# Patient Record
Sex: FEMALE | Race: WHITE
[De-identification: names, ages, dates, MRNs, and addresses within clinical notes are randomized per-mention and may not be internally consistent; named-entity substitution may affect disease eponyms.]

---

## 2017-09-09 ENCOUNTER — HOSPITAL ENCOUNTER (EMERGENCY)
Dept: HOSPITAL 80 - FED | Age: 22
Discharge: HOME | End: 2017-09-09
Payer: COMMERCIAL

## 2017-09-09 VITALS
OXYGEN SATURATION: 98 % | SYSTOLIC BLOOD PRESSURE: 114 MMHG | TEMPERATURE: 99.5 F | DIASTOLIC BLOOD PRESSURE: 70 MMHG | HEART RATE: 79 BPM

## 2017-09-09 VITALS — RESPIRATION RATE: 18 BRPM

## 2017-09-09 DIAGNOSIS — T83.89XA: Primary | ICD-10-CM

## 2017-09-09 DIAGNOSIS — E86.9: ICD-10-CM
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
## 2017-09-09 LAB
% IMMATURE GRANULYOCYTES: 0.2 % (ref 0–1.1)
ABSOLUTE IMMATURE GRANULOCYTES: 0.01 10^3/UL (ref 0–0.1)
ABSOLUTE NRBC COUNT: 0 10^3/UL (ref 0–0.01)
ADD DIFF?: NO
ADD MORPH?: NO
ADD SCAN?: NO
ANION GAP SERPL CALC-SCNC: 10 MEQ/L (ref 8–16)
ATYPICAL LYMPHOCYTE FLAG: 10 (ref 0–99)
CALCIUM SERPL-MCNC: 9.7 MG/DL (ref 8.5–10.4)
CHLORIDE SERPL-SCNC: 105 MEQ/L (ref 97–110)
CO2 SERPL-SCNC: 22 MEQ/L (ref 22–31)
COLOR UR: (no result)
CREAT SERPL-MCNC: 0.9 MG/DL (ref 0.6–1)
ERYTHROCYTE [DISTWIDTH] IN BLOOD BY AUTOMATED COUNT: 12.6 % (ref 11.5–15.2)
FRAGMENT RBC FLAG: 0 (ref 0–99)
GFR SERPL CREATININE-BSD FRML MDRD: > 60 ML/MIN/{1.73_M2}
GLUCOSE SERPL-MCNC: 74 MG/DL (ref 70–100)
HCT VFR BLD CALC: 40.4 % (ref 38–47)
HGB BLD-MCNC: 13.8 G/DL (ref 12.6–16.3)
LEFT SHIFT FLG: 0 (ref 0–99)
LIPEMIA HEMOLYSIS FLAG: 90 (ref 0–99)
MCH RBC BLDCO QN: 29.6 PG (ref 27.9–34.1)
MCHC RBC AUTO-ENTMCNC: 34.2 G/DL (ref 32.4–36.7)
MCV RBC AUTO: 86.7 FL (ref 81.5–99.8)
MUCOUS THREADS #/AREA URNS LPF: (no result) /LPF
NITRITE UR QL STRIP: NEGATIVE
NRBC-AUTO%: 0 % (ref 0–0.2)
PH UR STRIP: 5 [PH] (ref 5–7.5)
PLATELET # BLD: 285 10^3/UL (ref 150–400)
PLATELET CLUMPS FLAG: 0 (ref 0–99)
PMV BLD AUTO: 10.9 FL (ref 8.7–11.7)
POTASSIUM SERPL-SCNC: 4.3 MEQ/L (ref 3.5–5.2)
RBC # BLD AUTO: 4.66 10^6/UL (ref 4.18–5.33)
RBC #/AREA URNS HPF: (no result) /HPF (ref 0–3)
SODIUM SERPL-SCNC: 137 MEQ/L (ref 134–144)
SP GR UR STRIP: 1.01 (ref 1–1.03)
WBC #/AREA URNS HPF: (no result) /HPF (ref 0–3)

## 2017-09-09 NOTE — EDPHY
H & P


Smoking Status: Never smoked


Time Seen by Provider: 09/09/17 14:17


HPI/ROS: 


CHIEF COMPLAINT:  Abdominal pain





HISTORY OF PRESENT ILLNESS:  22-year-old female presents to the emergency 

department with right lower quadrant abdominal pain.  The patient states that 

over the last week she has had mild intermittent pain in her right lower 

quadrant which today became acutely worse.  No vomiting.  No diarrhea.  No 

reported trauma.  She had some lower back discomfort earlier in the week which 

has resolved.  She had an outpatient ultrasound performed however did not know 

the results of this.  She feels that the pain is much worse today.  She denies 

chest pain or difficulty breathing.  No fevers or chills.  She is due to have 

her menstrual period 3 or 4 days.  She has a ParaGard IUD.





REVIEW OF SYSTEMS:


Constitutional:  No fever, no chills.


Eyes:  No double or blurry vision.


ENT:  No sore throat.


Respiratory:  No cough, no shortness of breath.


Cardiac:  No chest pain.


Gastrointestinal:  Abdominal pain as above.  No vomiting or diarrhea.


Genitourinary:  No dysuria.


Musculoskeletal:  No neck or back pain.


Skin:  No rashes.


Neurological:  No headache. (Nano Bernardrina INOCENCIA)


Past Medical/Surgical History: 





negative


 (JoanaRadha INOCENCIA)


Social History: 





[employer] student from [place] (Gerri Bernarda INOCENCIA)


Physical Exam: 





General Appearance:  Alert, mild-to-moderate distress.  Afebrile.


Eyes:  Pupils equal and round.  Extraocular motions are all intact.


ENT:  Mouth:  Mucous membranes moist.


Respiratory:  No wheezing, rhonchi, or rales, lungs are clear to auscultation.


Cardiovascular:  Regular rate and rhythm.


Gastrointestinal:  Abdomen is soft.  Tenderness with palpation in the right 

lower quadrant.  No rebound, guarding or masses noted.  No CVA tenderness 

bilaterally.


Neurological:  Alert and oriented x 3, cranial nerves II through XII grossly 

intact


Skin:  Warm and dry, no rashes.


Musculoskeletal:  Nontender to palpate along the cervical, thoracic or lumbar 

spine.  Neck is supple.


Extremities:  Full range of motion and no peripheral edema.


Psychiatric:  Patient is oriented X 3, there is no agitation. (Nano Bernardrina INOCENCIA)


Constitutional: 


 Initial Vital Signs











Temperature (C)  37.4 C   09/09/17 14:12


 


Heart Rate  94   09/09/17 14:12


 


Respiratory Rate  18   09/09/17 14:12


 


Blood Pressure  121/76 H  09/09/17 14:12


 


O2 Sat (%)  96   09/09/17 14:12








 











O2 Delivery Mode               Room Air














Allergies/Adverse Reactions: 


 





fluconazole [From Diflucan] Allergy (Unknown, Verified 06/26/15 17:40)


 


ciprofloxacin [From Cipro] Allergy (Verified 09/09/17 14:13)


 


metronidazole [From Flagyl] Allergy (Verified 12/01/16 22:45)


 


pineapple Allergy (Verified 06/26/15 17:40)


 








Home Medications: 














 Medication  Instructions  Recorded


 


Claritin  12/01/16


 


Lexapro  12/01/16














Medical Decision Making





- Diagnostics


Imaging: Discussed imaging studies w/ On call Radiologist





- Diagnostics


Imaging Results: 


 Imaging Impressions





Abdomen Ultrasound  09/09/17 14:54


Impression: 


1. Normal pelvic ultrasound. IUD in good position within the endometrial canal.


2. Normal appendix in the right lower quadrant. 


 


Findings discussed with Radha Bernrad PA-C at  16:59 hour, 9/9/2017.








Pelvic/Renal Ultrasound  09/09/17 14:54


Impression: 


1. Normal pelvic ultrasound. IUD in good position within the endometrial canal.


2. Normal appendix in the right lower quadrant. 


 


Findings discussed with Radha Bernard PA-C at  16:59 hour, 9/9/2017.











ED Course/Re-evaluation: 





22-year-old female presents to the emergency department with abdominal pain.  

Laboratory studies reveal normal white blood cell count and chemistries.  

Urinalysis is unremarkable.





Pelvic ultrasound reveals no evidence of ovarian torsion.  She had her IUD 

which was displaced and stuck in the myometrium on the right side.  Her 

appendix was well visualized and normal.





I spoke with Dr. Ernestina Mcguire who came to evaluate the patient as well and 

removed the patient's IUD.  The patient felt much better.  She was given 15 mg 

of IV Toradol.  She is comfortable being discharged home.





Dr. Ernestina Mcguire recommended obtaining dirty urine to test for gonorrhea and 

chlamydia.  This has been ordered and is pending.  Patient feels comfortable 

being discharged home. (Radha Bernard)


Differential Diagnosis: 





The patient was evaluated and managed by the physician's assistant.  My 

cosignature indicates that I reviewed the chart and I agree with the findings 

and plan of care as documented.  I am the secondary supervising physician. (

Maria Ines Shafer)





Including but not limited to ovarian cyst, ovarian torsion, acute appendicitis, 

urinary tract infection, pyelonephritis, ectopic pregnancy (Radha Bernard)





- Data Points


Laboratory Results: 


 Laboratory Results





 09/09/17 14:17 





 09/09/17 14:17 





 











  09/09/17 09/09/17 09/09/17





  18:43 16:16 14:17


 


WBC      





    


 


RBC      





    


 


Hgb      





    


 


Hct      





    


 


MCV      





    


 


MCH      





    


 


MCHC      





    


 


RDW      





    


 


Plt Count      





    


 


MPV      





    


 


Neut % (Auto)      





    


 


Lymph % (Auto)      





    


 


Mono % (Auto)      





    


 


Eos % (Auto)      





    


 


Baso % (Auto)      





    


 


Nucleat RBC Rel Count      





    


 


Absolute Neuts (auto)      





    


 


Absolute Lymphs (auto)      





    


 


Absolute Monos (auto)      





    


 


Absolute Eos (auto)      





    


 


Absolute Basos (auto)      





    


 


Absolute Nucleated RBC      





    


 


Immature Gran %      





    


 


Immature Gran #      





    


 


Sodium      





    


 


Potassium      





    


 


Chloride      





    


 


Carbon Dioxide      





    


 


Anion Gap      





    


 


BUN      





    


 


Creatinine      





    


 


Estimated GFR      





    


 


Glucose      





    


 


Calcium      





    


 


Beta HCG, Qual      NEGATIVE 





    


 


Urine Color    PALE YELLOW   





    


 


Urine Appearance    CLEAR   





    


 


Urine pH    5.0   





    (5.0-7.5)  


 


Ur Specific Gravity    1.009   





    (1.002-1.030)  


 


Urine Protein    NEGATIVE   





    (NEGATIVE)  


 


Urine Ketones    NEGATIVE   





    (NEGATIVE)  


 


Urine Blood    1+  H   





    (NEGATIVE)  


 


Urine Nitrate    NEGATIVE   





    (NEGATIVE)  


 


Urine Bilirubin    NEGATIVE   





    (NEGATIVE)  


 


Urine Urobilinogen    NEGATIVE EU EU  





    (0.2-1.0)  


 


Ur Leukocyte Esterase    NEGATIVE   





    (NEGATIVE)  


 


Urine RBC    1-3 /hpf /hpf  





    (0-3)  


 


Urine WBC    1-3 /hpf /hpf  





    (0-3)  


 


Ur Epithelial Cells    TRACE /lpf /lpf  





    (NONE-1+)  


 


Urine Mucus    TRACE /lpf /lpf  





    (NONE-1+)  


 


Urine Glucose    NEGATIVE   





    (NEGATIVE)  


 


C.trachomatis RNA (TMA)  Pending     





    


 


N.gonorrhoeae RNA (TMA)  Pending     





    














  09/09/17 09/09/17





  14:17 14:17


 


WBC    5.97 10^3/uL 10^3/uL





    (3.80-9.50) 


 


RBC    4.66 10^6/uL 10^6/uL





    (4.18-5.33) 


 


Hgb    13.8 g/dL g/dL





    (12.6-16.3) 


 


Hct    40.4 % %





    (38.0-47.0) 


 


MCV    86.7 fL fL





    (81.5-99.8) 


 


MCH    29.6 pg pg





    (27.9-34.1) 


 


MCHC    34.2 g/dL g/dL





    (32.4-36.7) 


 


RDW    12.6 % %





    (11.5-15.2) 


 


Plt Count    285 10^3/uL 10^3/uL





    (150-400) 


 


MPV    10.9 fL fL





    (8.7-11.7) 


 


Neut % (Auto)    58.0 % %





    (39.3-74.2) 


 


Lymph % (Auto)    32.0 % %





    (15.0-45.0) 


 


Mono % (Auto)    8.0 % %





    (4.5-13.0) 


 


Eos % (Auto)    1.3 % %





    (0.6-7.6) 


 


Baso % (Auto)    0.5 % %





    (0.3-1.7) 


 


Nucleat RBC Rel Count    0.0 % %





    (0.0-0.2) 


 


Absolute Neuts (auto)    3.46 10^3/uL 10^3/uL





    (1.70-6.50) 


 


Absolute Lymphs (auto)    1.91 10^3/uL 10^3/uL





    (1.00-3.00) 


 


Absolute Monos (auto)    0.48 10^3/uL 10^3/uL





    (0.30-0.80) 


 


Absolute Eos (auto)    0.08 10^3/uL 10^3/uL





    (0.03-0.40) 


 


Absolute Basos (auto)    0.03 10^3/uL 10^3/uL





    (0.02-0.10) 


 


Absolute Nucleated RBC    0.00 10^3/uL 10^3/uL





    (0-0.01) 


 


Immature Gran %    0.2 % %





    (0.0-1.1) 


 


Immature Gran #    0.01 10^3/uL 10^3/uL





    (0.00-0.10) 


 


Sodium  137 mEq/L mEq/L  





   (134-144)  


 


Potassium  4.3 mEq/L mEq/L  





   (3.5-5.2)  


 


Chloride  105 mEq/L mEq/L  





   ()  


 


Carbon Dioxide  22 mEq/l mEq/l  





   (22-31)  


 


Anion Gap  10 mEq/L mEq/L  





   (8-16)  


 


BUN  14 mg/dL mg/dL  





   (7-23)  


 


Creatinine  0.9 mg/dL mg/dL  





   (0.6-1.0)  


 


Estimated GFR  > 60   





   


 


Glucose  74 mg/dL mg/dL  





   ()  


 


Calcium  9.7 mg/dL mg/dL  





   (8.5-10.4)  


 


Beta HCG, Qual    





   


 


Urine Color    





   


 


Urine Appearance    





   


 


Urine pH    





   


 


Ur Specific Gravity    





   


 


Urine Protein    





   


 


Urine Ketones    





   


 


Urine Blood    





   


 


Urine Nitrate    





   


 


Urine Bilirubin    





   


 


Urine Urobilinogen    





   


 


Ur Leukocyte Esterase    





   


 


Urine RBC    





   


 


Urine WBC    





   


 


Ur Epithelial Cells    





   


 


Urine Mucus    





   


 


Urine Glucose    





   


 


C.trachomatis RNA (TMA)    





   


 


N.gonorrhoeae RNA (TMA)    





   











Medications Given: 


 








Discontinued Medications





Diphenhydramine HCl (Benadryl Injection)  50 mg IVP EDNOW ONE


   Stop: 09/09/17 15:10


   Last Admin: 09/09/17 15:18 Dose:  50 mg


Fentanyl (Sublimaze)  50 mcg IVP EDNOW ONE


   Stop: 09/09/17 16:33


   Last Admin: 09/09/17 17:06 Dose:  50 mcg


Hydromorphone HCl (Dilaudid)  0.5 mg IVP EDNOW ONE


   Stop: 09/09/17 14:54


   Last Admin: 09/09/17 15:02 Dose:  0.5 mg


Sodium Chloride (Ns)  1,000 mls @ 0 mls/hr IV EDNOW ONE; Wide Open


   PRN Reason: Protocol


   Stop: 09/09/17 14:54


   Last Admin: 09/09/17 15:02 Dose:  1,000 mls


Ketorolac Tromethamine (Toradol)  15 mg IVP EDNOW ONE


   Stop: 09/09/17 17:51


   Last Admin: 09/09/17 17:57 Dose:  15 mg


Ondansetron HCl (Zofran)  4 mg IVP EDNOW ONE


   Stop: 09/09/17 14:54


   Last Admin: 09/09/17 15:02 Dose:  4 mg


Ranitidine HCl (Zantac)  50 mg IVP EDNOW ONE


   Stop: 09/09/17 15:11


   Last Admin: 09/09/17 15:18 Dose:  50 mg








Departure





- Departure


Disposition: Home, Routine, Self-Care


Clinical Impression: 


IUD migration


Qualifiers:


 Encounter type: initial encounter Qualified Code(s): T83.89XA - Other 

specified complication of genitourinary prosthetic devices, implants and grafts

, initial encounter





Abdominal pain


Qualifiers:


 Abdominal location: right lower quadrant Qualified Code(s): R10.31 - Right 

lower quadrant pain





Condition: Good


Instructions:  Acute Abdominal Pain (ED)


Additional Instructions: 


Abdominal Pain:


Return to the Emergency Department immediately for increasing pain, fever, 

vomiting, or if not completely better in 8-12 hours.





Call [phone] for the results of your gonorrhea and chlamydia cultures.








Referrals: 


LIZZ MATA,. [Primary Care Provider] - As per Instructions


Ernestina Mcguire MD [Medical Doctor] - 5-7 days, call for appt.


(OB/GYN on call


)

## 2017-09-09 NOTE — GCON
[f 
rep st]



                                                                    CONSULTATION





EMERGENCY ROOM CONSULTATION



DATE OF CONSULTATION:  2017



Presentation diagnosis is abdominal and pelvic pain.



HISTORY OF PRESENT ILLNESS:  The patient is a 22-year-old,  0, who 
presents with acute onset of worsening right lower quadrant pain.  She was 
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  She reports that she did have increasing 
pain that was mild and intermittent in the right lower quadrant over the last 
week that would wax and wane and have varying severity.  Over the course of her 
life, she has had pain similar to this.  Today she became much worse, stabbing, 
sharp pain.  No nausea, vomiting, diarrhea.  No fever, chills.  No other 
significant symptoms.  She is due to have her menstrual period in the next few 
days, and she has a Paragard IUD that was placed 6 months ago.  Upon evaluation 
in the Emergency Department, she had a pelvic ultrasound.  The pelvic 
ultrasound revealed a uterus that was dorsiflexed, and the left arm of the IUD 
was appeared to penetrate the myometrium of the uterus, and that seemed to be 
the location corresponding to the worst part of the patient's pain.  Her 
ovaries were normal.  No abnormal cysts.  She also had a right lower quadrant 
ultrasound performed that showed a normal appendix, and she had no point 
tenderness over her appendix.  She had normal ovaries.  No other abnormalities 
in the uterus.  pulmonary embolus.  Vital signs are stable.  The rest of her 
labs were stable, including normal white count, normal electrolytes, and a 
negative pregnancy test.  Therefore, I was consulted to remove the IUD as it 
was penetrating the myometrium, and this is the most likely cause of her pain.



REVIEW OF SYSTEMS:  Negative as per HPI.



PAST MEDICAL HISTORY:  She has no significant past medical or surgical history.



SOCIAL HISTORY:  She is a student at the [employer].  She denies 
smoking.  Social alcohol.  No drug use.  She is in a monogamous relationship 
with her boyfriend, and neither have had other partners.



ALLERGIES:  She reports allergies to fluconazole, Cipro, metronidazole, and 
pineapple.



MEDICATIONS:  Claritin and Lexapro.



FAMILY HISTORY:  Noncontributory.



HOSPITAL COURSE:  I came to analyze the patient for removal of an IUD.



PHYSICAL EXAMINATION:  VITAL SIGNS:  Stable.  GENERAL:  She is a well-developed
, well-nourished white female, in no acute distress.  LUNGS:  Clear to 
auscultation bilaterally.  HEART:  Regular rate and rhythm.  No murmur.  ABDOMEN
:  Soft, nondistended.  Normal bowel sounds.  Mild tenderness in lower 
quadrants.  No rebound or guarding.  PELVIC:  Normal cervix.  ParaGard strings 
were easily visualized.  These were grasped, and the IUD was removed without 
difficulty.  Further pelvic exam, no cervical motion tenderness.  Uterus is 
midline.  No fundal tenderness.  Midline retroverted.  No adnexal masses.



ASSESSMENT AND PLAN:  A 22-year-old, G0 with acute onset of right lower 
quadrant pain, likely from an IUD penetrating into the myometrium.  This was 
removed.  I instructed patient to use condoms for contraception.  Follow up in 
my office in the next few weeks.  We can do an ultrasound as a baseline to 
assess her uterine anatomy, and evaluate her for another IUD if she desires 
with ultrasound guidance at a later time or alternative contraceptive measures.
  We will send a gonorrhea and chlamydia urine to confirm no infection.





Job #:  [id number]

MTDD

## 2017-09-09 NOTE — CPEKG
Heart Rate: 64

RR Interval: 938

P-R Interval: 160

QRSD Interval: 66

QT Interval: 360

QTC Interval: 372

P Axis: 46

QRS Axis: 68

T Wave Axis: 34

EKG Severity - NORMAL ECG -

EKG Impression: SINUS RHYTHM

Electronically Signed By: Maria Ines Shafer 09-Sep-2017 20:01:11

## 2017-09-11 LAB — CHLAMYDIA AMPLIFICATION GENPRB: NEGATIVE

## 2017-09-29 ENCOUNTER — HOSPITAL ENCOUNTER (EMERGENCY)
Dept: HOSPITAL 80 - FED | Age: 22
Discharge: HOME | End: 2017-09-29
Payer: COMMERCIAL

## 2017-09-29 VITALS
HEART RATE: 66 BPM | RESPIRATION RATE: 18 BRPM | SYSTOLIC BLOOD PRESSURE: 106 MMHG | OXYGEN SATURATION: 99 % | DIASTOLIC BLOOD PRESSURE: 75 MMHG | TEMPERATURE: 98.9 F
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
## 2017-09-29 DIAGNOSIS — S09.8XXA: Primary | ICD-10-CM
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
## 2017-09-29 NOTE — EDPHY
H & P


Stated Complaint: Hit head with flag. Possible laceration.


HPI/ROS: 





CHIEF COMPLAINT: 


Head injury





HISTORY OF PRESENT ILLNESS: 


Patient reports a head injury at 5:15 p.m. today.  She says she was twirling a 

flag when she was struck on the top of the head.  No loss of consciousness.  

She did sustain a laceration and "lots of blood."  She complains of a headache.

  She has no neck pain or stiffness.  No dental pain.  No facial pain.  No 

bruising around the eyes.  No nausea or vomiting.  No changes in vision.  

Symptoms mild-to-moderate but constant.  No other associated complaints or 

modifying factors.





REVIEW OF SYSTEMS:


Ten systems reviewed and are negative unless otherwise noted in the HPI





PCP:


[facility]





SPECIALISTS:


None





PAST MEDICAL HISTORY: 


Anxiety, PTSD





PAST SURGICAL HISTORY:


Labral repair June 2016





SOCIAL HISTORY:


Nonsmoker.  Occasional alcohol.  No illicit substance use.  Currently a student 

at [employer].





FAMILY HISTORY:


Noncontributory





EXAMINATION


General Appearance:  Alert, no distress


Head: normocephalic, superficial laceration less than 1 cm top of the scalp in 

the 1st part of the hairline.  No bleeding.  No foreign body.  There is less 

than 1 mm distraction of the wound borders.  No Ziegler sign.  No raccoon eyes.


Eyes:  Pupils equal and round, no conjunctival pallor or injection


ENT, Mouth:  No hemotympanum.  Mucous membranes moist.  Airway widely patent


Neck:  Normal inspection, supple, non-tender.  Painless range of motion all 

planes.  No meningismus or rigidity


Respiratory:  Lungs are clear to auscultation no wheezing rhonchi


Cardiovascular:  Regular rate and rhythm.  No murmur.


Gastrointestinal:  Abdomen is soft and nontender


Back: non-tender, no bony abnormalities


Neurological:  GCS 15. A&O, nonfocal, normal gait.  Strength symmetric in all 4 

limbs.  No pronator drift.  No dysmetria.  Normal mental status


Skin:  Warm and dry, no rash.  Scalp lacerations noted.  No petechiae or 

purpura.  No ecchymosis


Extremities:  Nontender, no pedal edema


Psychiatric:  Mood and affect normal





DIFFERENTIAL DIAGNOSES:


Including but not limited to closed head injury, scalp laceration, contusion, 

hematoma, intracranial hemorrhage, skull fracture








MDM:


7:25 p.m.


Blunt trauma to the top of the scalp by a flag pole.  She has no loss of 

consciousness.  She has no nausea or vomiting.  She has no visual change.  She 

does have a headache but is tolerable.  There is no indication for CT scan of 

the head by [place] CT head rules.  She is awake alert no acute distress.  

Neuro exam is normal.  We discussed discharge home with symptomatic 

medications.  We discussed monitoring for any changes in her symptoms that 

would warrant return to the emergency department.  She is comfortable with this 

plan and discharged home stable condition.  Recommend follow up on campus at 

[facility] for return to [facility]














- Personal History


LMP (Females 10-55): 1-7 Days Ago


Current Tetanus/Diphtheria Vaccine: Yes


Current Tetanus Diphtheria and Acellular Pertussis (TDAP): Yes





- Medical/Surgical History


Hx Asthma: No


Hx Chronic Respiratory Disease: No


Hx Diabetes: No


Hx Cardiac Disease: No


Hx Renal Disease: No


Hx Cirrhosis: No


Hx Alcoholism: No


Hx HIV/AIDS: No


Hx Splenectomy or Spleen Trauma: No


Other PMH: anxiety, R shoulder surg





- Social History


Smoking Status: Never smoked


Constitutional: 


 Initial Vital Signs











Temperature (C)  98.9 F   09/29/17 18:54


 


Heart Rate  66   09/29/17 18:54


 


Respiratory Rate  18   09/29/17 18:54


 


Blood Pressure  106/75   09/29/17 18:54


 


O2 Sat (%)  99   09/29/17 18:54








 











O2 Delivery Mode               Room Air














Allergies/Adverse Reactions: 


 





fluconazole [From Diflucan] Allergy (Unknown, Verified 06/26/15 17:40)


 


ciprofloxacin [From Cipro] Allergy (Verified 09/09/17 14:13)


 


metronidazole [From Flagyl] Allergy (Verified 12/01/16 22:45)


 


pineapple Allergy (Verified 06/26/15 17:40)


 








Home Medications: 














 Medication  Instructions  Recorded


 


Claritin  12/01/16


 


Lexapro  12/01/16














Medical Decision Making





- Data Points


Medications Given: 


 








Discontinued Medications





Ibuprofen (Motrin)  600 mg PO EDNOW ONE


   Stop: 09/29/17 19:16


   Last Admin: 09/29/17 19:20 Dose:  600 mg








Departure





- Departure


Disposition: Home, Routine, Self-Care


Clinical Impression: 


Blunt head trauma


Qualifiers:


 Encounter type: initial encounter Qualified Code(s): S09.8XXA - Other 

specified injuries of head, initial encounter





Closed head injury


Qualifiers:


 Encounter type: initial encounter Qualified Code(s): S09.90XA - Unspecified 

injury of head, initial encounter





Condition: Good


Instructions:  Concussion (ED), Head Injury (ED)


Additional Instructions: 


1. Bacitracin topically once daily for 2 days


2. Head injury precautions as discussed


3. Contact  on Monday morning to discuss paperwork for your flag 

corps clearance. [phone]


4. Ibuprofen 600 mg every 8 hours as needed


Referrals: 


NONE *PRIMARY CARE P,. [Primary Care Provider] - As per Instructions


[facility] [Cl[facility]] - As per Instructions


Kate Osborne MD [Medical Doctor] - As per Instructions


Stand Alone Forms:  Statement of Treatment

## 2019-04-16 ENCOUNTER — HOSPITAL ENCOUNTER (OUTPATIENT)
Dept: HOSPITAL 80 - FED | Age: 24
Setting detail: OBSERVATION
LOS: 2 days | Discharge: HOME | End: 2019-04-18
Attending: INTERNAL MEDICINE | Admitting: INTERNAL MEDICINE
Payer: COMMERCIAL

## 2019-04-16 DIAGNOSIS — E86.0: ICD-10-CM

## 2019-04-16 DIAGNOSIS — A07.3: Primary | ICD-10-CM

## 2019-04-16 LAB — PLATELET # BLD: 213 10^3/UL (ref 150–400)

## 2019-04-16 PROCEDURE — G0378 HOSPITAL OBSERVATION PER HR: HCPCS

## 2019-04-16 RX ADMIN — NITAZOXANIDE SCH MG: 500 TABLET ORAL at 23:37

## 2019-04-16 RX ADMIN — SODIUM CHLORIDE SCH MLS: 900 INJECTION, SOLUTION INTRAVENOUS at 23:10

## 2019-04-16 NOTE — EDPHY
H & P


Time Seen by Provider: 04/16/19 17:39


HPI/ROS: 





CHIEF COMPLAINT:  Abdominal pain cramping and diarrhea





HISTORY OF PRESENT ILLNESS:  Feeling well until this last weekend when she just 

felt more tired than usual.  Started having severe watery diarrhea 2 days ago 

on Sunday and felt dehydrated.  She was seen at [facility] on Monday 

and got a L of IV fluids and felt lightheaded and had an episode of syncope 

there.  She was seen today and diagnosed with Cryptosporidium by a stool sample

, started on Alinia, and given another L of IV fluid.


Presents now with abdominal cramping dizziness feeling lightheaded.  Moderate 

to severe, not associated with vomiting but she does have nausea.





REVIEW OF SYSTEMS:


Eye: no change in vision


ENT: no sore throat


Cardiac: no chest pain or syncope


Pulmonary: no cough or SOB


Abdomen:  HPI


Musculoskeletal: no back pain


Skin: no rash


Neuro: no headache


Constitutional: no fever


: no urinary symptoms





A comprehensive 10 point review of systems is otherwise negative aside from 

elements mentioned in the history of present illness.





PAST MEDICAL HISTORY:  Anxiety and right shoulder surgery





Social history:  No recent foreign travel





General Appearance: Alert and conversant, cooperative.


Eyes: No scleral  icterus. 


ENT, Mouth:  Dry mucous membranes.


Respiratory: Normal respiratory effort, breath sounds equal, lungs are clear to 

auscultation.


Cardiovascular:  Regular rate and rhythm.


Gastrointestinal:  Mild right upper quadrant tenderness, bowel sounds present, 

no rebound or guarding.  No McBurney's point tenderness.


Neurological: Alert, face symmetric, normal motor and sensory in extremities. 


Skin: Warm and dry, no rashes.


Musculoskeletal: No peripheral edema.


Psychiatric: Not agitated.





Emergency Department course/MDM:





IV normal saline hydration, CBC chemistry LFT lipase and pregnancy test.





2027:  Still symptomatic, 2nd dose IV fentanyl, was seen by pharmacy in the 

emergency department, likely a side effect of her Alinia.


Discussed with Dr. Monsivais hospitalist service will admit for symptomatic 

treatment.  Discussed with the patient who is in agreement.


Smoking Status: Never smoked


Constitutional: 


 Initial Vital Signs











Temperature (C)  37 C   04/16/19 17:12


 


Heart Rate  74   04/16/19 17:12


 


Respiratory Rate  17   04/16/19 17:12


 


Blood Pressure  95/63 L  04/16/19 17:12


 


O2 Sat (%)  98   04/16/19 17:12








 











O2 Delivery Mode               Room Air














Allergies/Adverse Reactions: 


 





fluconazole [From Diflucan] Allergy (Unknown, Verified 04/16/19 17:11)


 


ciprofloxacin [From Cipro] Allergy (Verified 04/16/19 17:11)


 


metronidazole [From Flagyl] Allergy (Verified 04/16/19 17:11)


 


pineapple Allergy (Verified 04/16/19 17:11)


 








Home Medications: 














 Medication  Instructions  Recorded


 


Escitalopram Oxalate [Lexapro] 30 mg PO DAILY 12/01/16


 


Cyanocobalamin [Vitamin B12 1,000 mcg IM Q28D 04/16/19





1000MCG/ML (*)]  


 


Etonogestrel/Ethinyl Estradiol 1 each VG Q21D 04/16/19





[Nuvaring Vaginal Ring (RX)]  


 


Nitazoxanide [Alinia] 500 mg PO Q12 04/16/19


 


Ondansetron HCl [Ondansetron HCl] 4 mg PO Q6HRS PRN 04/16/19


 


traZODone [traZODONE 50MG (*)] 50 mg PO HS 04/16/19














Medical Decision Making





- Data Points


Laboratory Results: 


 Laboratory Results





 04/16/19 17:53 





 04/16/19 17:53 





 











  04/16/19 04/16/19 04/16/19





  17:57 17:53 17:53


 


WBC      





    


 


RBC      





    


 


Hgb      





    


 


POC Hgb  11.6 gm/dL L gm/dL    





   (12.6-16.3)   


 


Hct      





    


 


POC Hct  34 % L %    





   (38-47)   


 


MCV      





    


 


MCH      





    


 


MCHC      





    


 


RDW      





    


 


Plt Count      





    


 


MPV      





    


 


Neut % (Auto)      





    


 


Lymph % (Auto)      





    


 


Mono % (Auto)      





    


 


Eos % (Auto)      





    


 


Baso % (Auto)      





    


 


Nucleat RBC Rel Count      





    


 


Absolute Neuts (auto)      





    


 


Absolute Lymphs (auto)      





    


 


Absolute Monos (auto)      





    


 


Absolute Eos (auto)      





    


 


Absolute Basos (auto)      





    


 


Absolute Nucleated RBC      





    


 


Immature Gran %      





    


 


Seg Neutrophils %      





    


 


Band Neutrophils %      





    


 


Lymphocytes %      





    


 


Monocytes %      





    


 


Eosinophils %      





    


 


Basophils %      





    


 


Metamyelocytes %      





    


 


Myelocytes %      





    


 


Promyelocytes %      





    


 


Blast Cells %      





    


 


Immature Gran #      





    


 


Absolute Seg Neuts      





    


 


Absolute Band Neuts      





    


 


Absolute Lymphocytes      





    


 


Absolute Monocytes      





    


 


Absolute Eosinophils      





    


 


Absolute Basophils      





    


 


Absolute Metamyelocyte      





    


 


Absolute Myelocytes      





    


 


Absolute Promyelocytes      





    


 


Absolute Plasma Cells      





    


 


Nucleated RBCs      





    


 


Atypical Lymphocytes      





    


 


Absolute Blast Cells      





    


 


Plasma Cells %      





    


 


Platelet Estimate      





    


 


Microcytic Cells      





    


 


POC Sodium  143 mEq/L mEq/L    





   (135-145)   


 


Sodium      137 mEq/L mEq/L





     (135-145) 


 


POC Potassium  3.4 mEq/L mEq/L    





   (3.3-5.0)   


 


Potassium      3.6 mEq/L mEq/L





     (3.5-5.2) 


 


POC Chloride  107 mEq/L mEq/L    





   ()   


 


Chloride      107 mEq/L mEq/L





     () 


 


Carbon Dioxide      23 mEq/l mEq/l





     (22-31) 


 


POC Total CO2  21 mEq/L L mEq/L    





   (22-31)   


 


Anion Gap      7 mEq/L mEq/L





     (6-14) 


 


POC BUN  4 mg/dL L mg/dL    





   (7-23)   


 


BUN      7 mg/dL mg/dL





     (7-23) 


 


Creatinine      0.8 mg/dL mg/dL





     (0.6-1.0) 


 


POC Creatinine  0.8 mg/dL mg/dL    





   (0.6-1.0)   


 


Estimated GFR      > 60 





    


 


Glucose      87 mg/dL mg/dL





     () 


 


POC Glucose  91 mg/dL mg/dL    





   ()   


 


Calcium      8.1 mg/dL L mg/dL





     (8.5-10.4) 


 


Total Bilirubin      0.3 mg/dL mg/dL





     (0.1-1.4) 


 


Conjugated Bilirubin      0.3 mg/dL mg/dL





     (0.0-0.5) 


 


Unconjugated Bilirubin      0.0 mg/dL mg/dL





     (0.0-1.1) 


 


AST      20 IU/L IU/L





     (14-46) 


 


ALT      28 IU/L IU/L





     (9-52) 


 


Alkaline Phosphatase      32 IU/L L IU/L





     () 


 


Total Protein      5.8 g/dL L g/dL





     (6.3-8.2) 


 


Albumin      3.4 g/dL L g/dL





     (3.5-5.0) 


 


Lipase      88 IU/L IU/L





     () 


 


Beta HCG, Qual    NEGATIVE   





    














  04/16/19





  17:53


 


WBC  2.68 10^3/uL L 10^3/uL





   (3.80-9.50) 


 


RBC  4.16 10^6/uL L 10^6/uL





   (4.18-5.33) 


 


Hgb  12.2 g/dL L g/dL





   (12.6-16.3) 


 


POC Hgb  





  


 


Hct  35.6 % L %





   (38.0-47.0) 


 


POC Hct  





  


 


MCV  85.6 fL fL





   (81.5-99.8) 


 


MCH  29.3 pg pg





   (27.9-34.1) 


 


MCHC  34.3 g/dL g/dL





   (32.4-36.7) 


 


RDW  12.3 % %





   (11.5-15.2) 


 


Plt Count  213 10^3/uL 10^3/uL





   (150-400) 


 


MPV  11.0 fL fL





   (8.7-11.7) 


 


Neut % (Auto)  Not Reported 





  


 


Lymph % (Auto)  Not Reported 





  


 


Mono % (Auto)  Not Reported 





  


 


Eos % (Auto)  Not Reported 





  


 


Baso % (Auto)  Not Reported 





  


 


Nucleat RBC Rel Count  Not Reported 





  


 


Absolute Neuts (auto)  Not Reported 





  


 


Absolute Lymphs (auto)  Not Reported 





  


 


Absolute Monos (auto)  Not Reported 





  


 


Absolute Eos (auto)  Not Reported 





  


 


Absolute Basos (auto)  Not Reported 





  


 


Absolute Nucleated RBC  Not Reported 





  


 


Immature Gran %  Not Reported 





  


 


Seg Neutrophils %  17.8 % %





  


 


Band Neutrophils %  7.9 % %





  


 


Lymphocytes %  62.4 % %





  


 


Monocytes %  8.9 % %





  


 


Eosinophils %  3.0 % %





  


 


Basophils %  0.0 % %





  


 


Metamyelocytes %  0.0 % %





  


 


Myelocytes %  0.0 % %





  


 


Promyelocytes %  0.0 % %





  


 


Blast Cells %  0.0 % %





  


 


Immature Gran #  Not Reported 





  


 


Absolute Seg Neuts  0.48 10^3/uL L 10^3/uL





   (1.70-6.50) 


 


Absolute Band Neuts  0.21 10^3/uL 10^3/uL





   (0.00-0.70) 


 


Absolute Lymphocytes  1.67 10^3/uL 10^3/uL





   (1.00-3.00) 


 


Absolute Monocytes  0.24 10^3/uL L 10^3/uL





   (0.30-0.80) 


 


Absolute Eosinophils  0.08 10^3/uL 10^3/uL





   (0.03-0.40) 


 


Absolute Basophils  0.00 10^3/uL L 10^3/uL





   (0.02-0.10) 


 


Absolute Metamyelocyte  0.00 10^3/mL 10^3/mL





   (0.00-0.00) 


 


Absolute Myelocytes  0.00 10^3/mL 10^3/mL





   (0.00-0.00) 


 


Absolute Promyelocytes  0.00 10^3/uL 10^3/uL





   (0.00-0.00) 


 


Absolute Plasma Cells  0.00 10^3/uL 10^3/uL





   (0.00-0.00) 


 


Nucleated RBCs  0 /100 WBC /100 WBC





   (0-0) 


 


Atypical Lymphocytes  1+  H 





  


 


Absolute Blast Cells  0.00 10^3/uL 10^3/uL





   (0.00-0.00) 


 


Plasma Cells %  0.0 % %





  


 


Platelet Estimate  ADEQUATE 





   (ADEQ) 


 


Microcytic Cells  1+  H 





  


 


POC Sodium  





  


 


Sodium  





  


 


POC Potassium  





  


 


Potassium  





  


 


POC Chloride  





  


 


Chloride  





  


 


Carbon Dioxide  





  


 


POC Total CO2  





  


 


Anion Gap  





  


 


POC BUN  





  


 


BUN  





  


 


Creatinine  





  


 


POC Creatinine  





  


 


Estimated GFR  





  


 


Glucose  





  


 


POC Glucose  





  


 


Calcium  





  


 


Total Bilirubin  





  


 


Conjugated Bilirubin  





  


 


Unconjugated Bilirubin  





  


 


AST  





  


 


ALT  





  


 


Alkaline Phosphatase  





  


 


Total Protein  





  


 


Albumin  





  


 


Lipase  





  


 


Beta HCG, Qual  





  











Medications Given: 


 








Discontinued Medications





Dicyclomine HCl (Bentyl)  20 mg PO EDNOW ONE


   Stop: 04/16/19 19:19


   Last Admin: 04/16/19 19:24 Dose:  20 mg


Fentanyl (Sublimaze)  50 mcg IVP EDNOW ONE


   Stop: 04/16/19 18:18


   Last Admin: 04/16/19 18:22 Dose:  50 mcg


Fentanyl (Sublimaze)  50 mcg IVP EDNOW ONE


   Stop: 04/16/19 20:28


   Last Admin: 04/16/19 20:30 Dose:  50 mcg


Sodium Chloride (Ns)  1,000 mls @ 0 mls/hr IV EDNOW ONE; Wide Open


   PRN Reason: Protocol


   Stop: 04/16/19 18:00


   Last Admin: 04/16/19 18:16 Dose:  1,000 mls


Sodium Chloride (Ns)  1,000 mls @ 0 mls/hr IV EDNOW ONE; Wide Open


   PRN Reason: Protocol


   Stop: 04/16/19 19:02


   Last Admin: 04/16/19 19:07 Dose:  1,000 mls


Lorazepam (Ativan Injection)  0.5 mg IVP EDNOW ONE


   Stop: 04/16/19 21:09


   Last Admin: 04/16/19 21:12 Dose:  0.5 mg





Point of Care Test Results: 


 Chemistry











  04/16/19





  17:57


 


POC Sodium  143 mEq/L mEq/L





   (135-145) 


 


POC Potassium  3.4 mEq/L mEq/L





   (3.3-5.0) 


 


POC Chloride  107 mEq/L mEq/L





   () 


 


POC Total CO2  21 mEq/L L mEq/L





   (22-31) 


 


POC BUN  4 mg/dL L mg/dL





   (7-23) 


 


POC Creatinine  0.8 mg/dL mg/dL





   (0.6-1.0) 


 


POC Glucose  91 mg/dL mg/dL





   () 








 ISTAT H&H











  04/16/19





  17:57


 


POC Hgb  11.6 gm/dL L gm/dL





   (12.6-16.3) 


 


POC Hct  34 % L %





   (38-47) 














Departure





- Departure


Disposition: [facility] Acute


Clinical Impression: 


 Diarrhea due to cryptosporidium





Abdominal pain


Qualifiers:


 Abdominal location: generalized Qualified Code(s): R10.84 - Generalized 

abdominal pain





Condition: Good

## 2019-04-16 NOTE — PDGENHP
<Magali Howard - Last Filed: 04/16/19 22:17>





History and Physical





- Chief Complaint


Abdominal cramping, diarrhea, nausea





- History of Present Illness


This is a 23-year-old female who is relatively healthy came into the emergency 

room after experiencing Cryptosporidium and prescribed Alinia today at 

[facility].  She reports having watery diarrhea on Sunday and 

felt extremely dehydrated; she was seen at the [facility] yesterday 

and received a L of IV fluids and at the same time felt lightheaded and had an 

episode of syncope while there.  She went back to [facility] today 

where a stool sample was performed and found to have Crytosporidium and also 

received another L of IV fluid.  She reported taking a dose of Alinia today and 

feels like her abdominal cramping is quite painful, and continues to feel very 

lightheaded and dehydrated.  She denies any recent travel.





She is being admitted for further workup, treatment and monitoring.





History Information





- Allergies/Home Medication List


Allergies/Adverse Reactions: 








fluconazole [From Diflucan] Allergy (Unknown, Verified 04/16/19 17:11)


 


ciprofloxacin [From Cipro] Allergy (Verified 04/16/19 17:11)


 


metronidazole [From Flagyl] Allergy (Verified 04/16/19 17:11)


 


pineapple Allergy (Verified 04/16/19 17:11)


 





Home Medications: 








Escitalopram Oxalate [Lexapro] 30 mg PO HS 12/01/16 [Last Taken 04/15/19]


Cyanocobalamin [Vitamin B12 1000MCG/ML (*)] 1,000 mcg IM Q28D 04/16/19 [Last 

Taken 04/01/19]


Etonogestrel/Ethinyl Estradiol [Nuvaring Vaginal Ring (RX)] 1 each VG Q21D 04/16 /19 [Last Taken Unknown]


Nitazoxanide [Alinia] 500 mg PO Q12 04/16/19 [Last Taken 04/16/19 10:00]


Ondansetron HCl [Ondansetron HCl] 4 mg PO Q6HRS PRN 04/16/19 [Last Taken Unknown

]


traZODone [traZODONE 50MG (*)] 50 mg PO HS 04/16/19 [Last Taken Unknown]





I have personally reviewed and updated: family history, medical history, social 

history, surgical history





- Past Medical History


Additional medical history: Anxiety, ovarian cyst, right shoulder surgery





- Surgical History


Reports: no pertinent surgical hx





- Family History


Positive for: non-pertinent





- Social History


Smoking Status: Never smoked


Alcohol Use: Occasionally


Drug Use: Marijuana


Additional social history: She goes to [employer] as a 

student; she is majoring in anthropology and environmental science; she is 

graduating this may





Review of Systems


Review of Systems: 





ROS: 10pt was reviewed & negative except for what was stated in HPI & below





Physical Exam


Physical Exam: 


Lab data and imaging reviewed.





White blood count:  2.68


Hemoglobin and hematocrit:  12.2 and 35.6


Platelet count:  213 





Sodium:137


Potassium:  3.6


Chloride:  107


Carbon dioxide:  23


BUN/Cr: 7/0.8

















Temp Pulse Resp BP Pulse Ox


 


 36.8 C   74   16   116/72   94 


 


 04/16/19 21:36  04/16/19 21:36  04/16/19 21:36  04/16/19 21:36  04/16/19 21:36











Constitutional: uncomfortable


Eyes: PERRL, anicteric sclera, EOMI


Ears, Nose, Mouth, Throat: hearing normal, ears appear normal, no oral mucosal 

ulcers, dry mucous membranes


Cardiovascular: regular rate and rhythym, no murmur, rub, or gallop, No edema


Peripheral Pulses: 2+: dorsalis-pedis (R), dorsalis-pedis (L)


Respiratory: no respiratory distress, no rales or rhonchi, clear to auscultation


Gastrointestinal: normoactive bowel sounds, no palpable masses, tenderness


Genitourinary: no bladder fullness, no bladder tenderness


Skin: warm, normal color, no rashes or abrasions, no fluctuance, no induration, 

No mottled


Musculoskeletal: full muscle strength, no muscle tenderness, normal joint ROM, 

no joint effusions


Neurologic: AAOx3, sensation intact bilaterally, CN II-XII Intact


Psychiatric: interacting appropriately, not anxious, not encephalopathic, 

thought process linear


Lymph, Heme, Immunologic: no cervical LAD, no supraclavicular LAD





Lab Data & Imaging Review





 04/16/19 17:53





 04/16/19 17:53














WBC  2.68 10^3/uL (3.80-9.50)  L  04/16/19  17:53    


 


RBC  4.16 10^6/uL (4.18-5.33)  L  04/16/19  17:53    


 


Hgb  12.2 g/dL (12.6-16.3)  L  04/16/19  17:53    


 


POC Hgb  11.6 gm/dL (12.6-16.3)  L  04/16/19  17:57    


 


Hct  35.6 % (38.0-47.0)  L  04/16/19  17:53    


 


POC Hct  34 % (38-47)  L  04/16/19  17:57    


 


MCV  85.6 fL (81.5-99.8)   04/16/19  17:53    


 


MCH  29.3 pg (27.9-34.1)   04/16/19  17:53    


 


MCHC  34.3 g/dL (32.4-36.7)   04/16/19  17:53    


 


RDW  12.3 % (11.5-15.2)   04/16/19  17:53    


 


Plt Count  213 10^3/uL (150-400)   04/16/19  17:53    


 


MPV  11.0 fL (8.7-11.7)   04/16/19  17:53    


 


Neut % (Auto)  Not Reported   04/16/19  17:53    


 


Lymph % (Auto)  Not Reported   04/16/19  17:53    


 


Mono % (Auto)  Not Reported   04/16/19  17:53    


 


Eos % (Auto)  Not Reported   04/16/19  17:53    


 


Baso % (Auto)  Not Reported   04/16/19  17:53    


 


Nucleat RBC Rel Count  Not Reported   04/16/19  17:53    


 


Absolute Neuts (auto)  Not Reported   04/16/19  17:53    


 


Absolute Lymphs (auto)  Not Reported   04/16/19  17:53    


 


Absolute Monos (auto)  Not Reported   04/16/19  17:53    


 


Absolute Eos (auto)  Not Reported   04/16/19  17:53    


 


Absolute Basos (auto)  Not Reported   04/16/19  17:53    


 


Absolute Nucleated RBC  Not Reported   04/16/19  17:53    


 


Immature Gran %  Not Reported   04/16/19  17:53    


 


Seg Neutrophils %  17.8 %  04/16/19  17:53    


 


Band Neutrophils %  7.9 %  04/16/19  17:53    


 


Lymphocytes %  62.4 %  04/16/19  17:53    


 


Monocytes %  8.9 %  04/16/19  17:53    


 


Eosinophils %  3.0 %  04/16/19  17:53    


 


Basophils %  0.0 %  04/16/19  17:53    


 


Metamyelocytes %  0.0 %  04/16/19  17:53    


 


Myelocytes %  0.0 %  04/16/19  17:53    


 


Promyelocytes %  0.0 %  04/16/19  17:53    


 


Blast Cells %  0.0 %  04/16/19  17:53    


 


Immature Gran #  Not Reported   04/16/19  17:53    


 


Absolute Seg Neuts  0.48 10^3/uL (1.70-6.50)  L  04/16/19  17:53    


 


Absolute Band Neuts  0.21 10^3/uL (0.00-0.70)   04/16/19  17:53    


 


Absolute Lymphocytes  1.67 10^3/uL (1.00-3.00)   04/16/19  17:53    


 


Absolute Monocytes  0.24 10^3/uL (0.30-0.80)  L  04/16/19  17:53    


 


Absolute Eosinophils  0.08 10^3/uL (0.03-0.40)   04/16/19  17:53    


 


Absolute Basophils  0.00 10^3/uL (0.02-0.10)  L  04/16/19  17:53    


 


Absolute Metamyelocyte  0.00 10^3/mL (0.00-0.00)   04/16/19  17:53    


 


Absolute Myelocytes  0.00 10^3/mL (0.00-0.00)   04/16/19  17:53    


 


Absolute Promyelocytes  0.00 10^3/uL (0.00-0.00)   04/16/19  17:53    


 


Absolute Plasma Cells  0.00 10^3/uL (0.00-0.00)   04/16/19  17:53    


 


Nucleated RBCs  0 /100 WBC (0-0)   04/16/19  17:53    


 


Atypical Lymphocytes  1+  H  04/16/19  17:53    


 


Absolute Blast Cells  0.00 10^3/uL (0.00-0.00)   04/16/19  17:53    


 


Plasma Cells %  0.0 %  04/16/19  17:53    


 


Platelet Estimate  ADEQUATE  (ADEQ)   04/16/19  17:53    


 


Microcytic Cells  1+  H  04/16/19  17:53    


 


POC Sodium  143 mEq/L (135-145)   04/16/19  17:57    


 


Sodium  137 mEq/L (135-145)   04/16/19  17:53    


 


POC Potassium  3.4 mEq/L (3.3-5.0)   04/16/19  17:57    


 


Potassium  3.6 mEq/L (3.5-5.2)   04/16/19  17:53    


 


POC Chloride  107 mEq/L ()   04/16/19  17:57    


 


Chloride  107 mEq/L ()   04/16/19  17:53    


 


Carbon Dioxide  23 mEq/l (22-31)   04/16/19  17:53    


 


POC Total CO2  21 mEq/L (22-31)  L  04/16/19  17:57    


 


Anion Gap  7 mEq/L (6-14)   04/16/19  17:53    


 


POC BUN  4 mg/dL (7-23)  L  04/16/19  17:57    


 


BUN  7 mg/dL (7-23)   04/16/19  17:53    


 


Creatinine  0.8 mg/dL (0.6-1.0)   04/16/19  17:53    


 


POC Creatinine  0.8 mg/dL (0.6-1.0)   04/16/19  17:57    


 


Estimated GFR  > 60   04/16/19  17:53    


 


Glucose  87 mg/dL ()   04/16/19  17:53    


 


POC Glucose  91 mg/dL ()   04/16/19  17:57    


 


Calcium  8.1 mg/dL (8.5-10.4)  L  04/16/19  17:53    


 


Total Bilirubin  0.3 mg/dL (0.1-1.4)   04/16/19  17:53    


 


Conjugated Bilirubin  0.3 mg/dL (0.0-0.5)   04/16/19  17:53    


 


Unconjugated Bilirubin  0.0 mg/dL (0.0-1.1)   04/16/19  17:53    


 


AST  20 IU/L (14-46)   04/16/19  17:53    


 


ALT  28 IU/L (9-52)   04/16/19  17:53    


 


Alkaline Phosphatase  32 IU/L ()  L  04/16/19  17:53    


 


Total Protein  5.8 g/dL (6.3-8.2)  L  04/16/19  17:53    


 


Albumin  3.4 g/dL (3.5-5.0)  L  04/16/19  17:53    


 


Lipase  88 IU/L ()   04/16/19  17:53    


 


Beta HCG, Qual  NEGATIVE   04/16/19  17:53    











Assessment & Plan


Plan: 


22 y/o female who was tested positive at [facility] today for 

cryptosporidium.  Her vital signs are the following; blood pressure 118/72, 

heart rate 71, respirations 16, temperature 37.0 degrees, and oxygen saturation 

at 99% on room air.





Abdominal pain (Acute)


Diarrhea due to cryptosporidium (Acute)





#Cryptosporidium: Do not have [facility] records indicating this and they are 

now closed so I am unable to request records.


- GI panel PCR pending


- Cont Alinia unless GI panel results w/something different


-CBC/BMP in AM





#Abdominal pain/nausea


-Received Bentyl in ED; cont this QID


-Anti-emetics





#Dehydration


-Received 2L NS in ED; cont IVF x 2 bags








Diet: Regular


Code: Full


VTE ppx: Low risk


Dispo: admit to obs

















<Jo Monsivais - Last Filed: 04/17/19 01:12>





History and Physical





- History of Present Illness








Review of Systems


Review of Systems: 








Physical Exam


Physical Exam: 

















Temp Pulse Resp BP Pulse Ox


 


 37.2 C   73   16   123/64 H  96 


 


 04/16/19 22:17  04/16/19 22:17  04/16/19 22:17  04/16/19 22:17  04/16/19 22:17














Lab Data & Imaging Review





 04/16/19 17:53





 04/16/19 17:53














WBC  2.68 10^3/uL (3.80-9.50)  L  04/16/19  17:53    


 


RBC  4.16 10^6/uL (4.18-5.33)  L  04/16/19  17:53    


 


Hgb  12.2 g/dL (12.6-16.3)  L  04/16/19  17:53    


 


POC Hgb  11.6 gm/dL (12.6-16.3)  L  04/16/19  17:57    


 


Hct  35.6 % (38.0-47.0)  L  04/16/19  17:53    


 


POC Hct  34 % (38-47)  L  04/16/19  17:57    


 


MCV  85.6 fL (81.5-99.8)   04/16/19  17:53    


 


MCH  29.3 pg (27.9-34.1)   04/16/19  17:53    


 


MCHC  34.3 g/dL (32.4-36.7)   04/16/19  17:53    


 


RDW  12.3 % (11.5-15.2)   04/16/19  17:53    


 


Plt Count  213 10^3/uL (150-400)   04/16/19  17:53    


 


MPV  11.0 fL (8.7-11.7)   04/16/19  17:53    


 


Neut % (Auto)  Not Reported   04/16/19  17:53    


 


Lymph % (Auto)  Not Reported   04/16/19  17:53    


 


Mono % (Auto)  Not Reported   04/16/19  17:53    


 


Eos % (Auto)  Not Reported   04/16/19  17:53    


 


Baso % (Auto)  Not Reported   04/16/19  17:53    


 


Nucleat RBC Rel Count  Not Reported   04/16/19  17:53    


 


Absolute Neuts (auto)  Not Reported   04/16/19  17:53    


 


Absolute Lymphs (auto)  Not Reported   04/16/19  17:53    


 


Absolute Monos (auto)  Not Reported   04/16/19  17:53    


 


Absolute Eos (auto)  Not Reported   04/16/19  17:53    


 


Absolute Basos (auto)  Not Reported   04/16/19  17:53    


 


Absolute Nucleated RBC  Not Reported   04/16/19  17:53    


 


Immature Gran %  Not Reported   04/16/19  17:53    


 


Seg Neutrophils %  17.8 %  04/16/19  17:53    


 


Band Neutrophils %  7.9 %  04/16/19  17:53    


 


Lymphocytes %  62.4 %  04/16/19  17:53    


 


Monocytes %  8.9 %  04/16/19  17:53    


 


Eosinophils %  3.0 %  04/16/19  17:53    


 


Basophils %  0.0 %  04/16/19  17:53    


 


Metamyelocytes %  0.0 %  04/16/19  17:53    


 


Myelocytes %  0.0 %  04/16/19  17:53    


 


Promyelocytes %  0.0 %  04/16/19  17:53    


 


Blast Cells %  0.0 %  04/16/19  17:53    


 


Immature Gran #  Not Reported   04/16/19  17:53    


 


Absolute Seg Neuts  0.48 10^3/uL (1.70-6.50)  L  04/16/19  17:53    


 


Absolute Band Neuts  0.21 10^3/uL (0.00-0.70)   04/16/19  17:53    


 


Absolute Lymphocytes  1.67 10^3/uL (1.00-3.00)   04/16/19  17:53    


 


Absolute Monocytes  0.24 10^3/uL (0.30-0.80)  L  04/16/19  17:53    


 


Absolute Eosinophils  0.08 10^3/uL (0.03-0.40)   04/16/19  17:53    


 


Absolute Basophils  0.00 10^3/uL (0.02-0.10)  L  04/16/19  17:53    


 


Absolute Metamyelocyte  0.00 10^3/mL (0.00-0.00)   04/16/19  17:53    


 


Absolute Myelocytes  0.00 10^3/mL (0.00-0.00)   04/16/19  17:53    


 


Absolute Promyelocytes  0.00 10^3/uL (0.00-0.00)   04/16/19  17:53    


 


Absolute Plasma Cells  0.00 10^3/uL (0.00-0.00)   04/16/19  17:53    


 


Nucleated RBCs  0 /100 WBC (0-0)   04/16/19  17:53    


 


Atypical Lymphocytes  1+  H  04/16/19  17:53    


 


Absolute Blast Cells  0.00 10^3/uL (0.00-0.00)   04/16/19  17:53    


 


Plasma Cells %  0.0 %  04/16/19  17:53    


 


Platelet Estimate  ADEQUATE  (ADEQ)   04/16/19  17:53    


 


Microcytic Cells  1+  H  04/16/19  17:53    


 


POC Sodium  143 mEq/L (135-145)   04/16/19  17:57    


 


Sodium  137 mEq/L (135-145)   04/16/19  17:53    


 


POC Potassium  3.4 mEq/L (3.3-5.0)   04/16/19  17:57    


 


Potassium  3.6 mEq/L (3.5-5.2)   04/16/19  17:53    


 


POC Chloride  107 mEq/L ()   04/16/19  17:57    


 


Chloride  107 mEq/L ()   04/16/19  17:53    


 


Carbon Dioxide  23 mEq/l (22-31)   04/16/19  17:53    


 


POC Total CO2  21 mEq/L (22-31)  L  04/16/19  17:57    


 


Anion Gap  7 mEq/L (6-14)   04/16/19  17:53    


 


POC BUN  4 mg/dL (7-23)  L  04/16/19  17:57    


 


BUN  7 mg/dL (7-23)   04/16/19  17:53    


 


Creatinine  0.8 mg/dL (0.6-1.0)   04/16/19  17:53    


 


POC Creatinine  0.8 mg/dL (0.6-1.0)   04/16/19  17:57    


 


Estimated GFR  > 60   04/16/19  17:53    


 


Glucose  87 mg/dL ()   04/16/19  17:53    


 


POC Glucose  91 mg/dL ()   04/16/19  17:57    


 


Calcium  8.1 mg/dL (8.5-10.4)  L  04/16/19  17:53    


 


Total Bilirubin  0.3 mg/dL (0.1-1.4)   04/16/19  17:53    


 


Conjugated Bilirubin  0.3 mg/dL (0.0-0.5)   04/16/19  17:53    


 


Unconjugated Bilirubin  0.0 mg/dL (0.0-1.1)   04/16/19  17:53    


 


AST  20 IU/L (14-46)   04/16/19  17:53    


 


ALT  28 IU/L (9-52)   04/16/19  17:53    


 


Alkaline Phosphatase  32 IU/L ()  L  04/16/19  17:53    


 


Total Protein  5.8 g/dL (6.3-8.2)  L  04/16/19  17:53    


 


Albumin  3.4 g/dL (3.5-5.0)  L  04/16/19  17:53    


 


Lipase  88 IU/L ()   04/16/19  17:53    


 


Beta HCG, Qual  NEGATIVE   04/16/19  17:53    











Assessment & Plan


Assessment: 








Abdominal pain (Acute)


Diarrhea due to cryptosporidium (Acute)








Plan: 





PHYSICIAN ADDENDUM: 


Agree w/ above. 


Saw patient. Discussed w/ Maricel Howard NP. 


-Jo Monsivais DO

## 2019-04-17 LAB — PLATELET # BLD: 201 10^3/UL (ref 150–400)

## 2019-04-17 RX ADMIN — NITAZOXANIDE SCH MG: 500 TABLET ORAL at 20:45

## 2019-04-17 RX ADMIN — SODIUM CHLORIDE SCH MLS: 900 INJECTION, SOLUTION INTRAVENOUS at 06:13

## 2019-04-17 RX ADMIN — DICYCLOMINE HYDROCHLORIDE SCH MG: 20 TABLET ORAL at 11:36

## 2019-04-17 RX ADMIN — NITAZOXANIDE SCH MG: 500 TABLET ORAL at 10:10

## 2019-04-17 RX ADMIN — DICYCLOMINE HYDROCHLORIDE SCH MG: 20 TABLET ORAL at 17:44

## 2019-04-17 RX ADMIN — DICYCLOMINE HYDROCHLORIDE SCH MG: 20 TABLET ORAL at 20:38

## 2019-04-17 RX ADMIN — ONDANSETRON PRN MG: 4 TABLET, ORALLY DISINTEGRATING ORAL at 00:11

## 2019-04-17 RX ADMIN — DICYCLOMINE HYDROCHLORIDE SCH MG: 20 TABLET ORAL at 06:11

## 2019-04-17 NOTE — ASMTCASEMG
Living Arrangements

 

What is your living           Answers:  With Other (Not Family)               

arrangement? Who do you                                                       

live with?                                                                    

Type Of Residence

 

What kind of residence do     Answers:  Apartment                             

you live in?                                                                  

Discharge Plan Comments

 

Coordination Status           

Comments                      

Notes:

Patient is a 22yo single female, [employer] student, who is being admitted OBS for 

Cryptosporidium, abdominal pain/nausea and dehydration. Patient will most likely d/c 

independently. CM available for any d/c needs that might arise.

 

Date Signed:  04/17/2019 10:15 AM

Electronically Signed By:Subha Crawley LCSW

## 2019-04-17 NOTE — HOSPPROG
Hospitalist Progress Note


Assessment/Plan: 


22 y/o female admitted with NV and diarrhea after being started on Avinia for 

new diagnosis of Cryptosporidium. 








#Cryptosporidium: Do not have [facility] records indicating this and they are 

now closed so I am unable to request records.


- GI panel PCR pending


- Cont Alinia unless GI panel results w/something different


-CBC/BMP in AM





#Abdominal pain- likely secondary to cryptosporidium and Alinia


-Received Bentyl in ED; cont this QID


-Anti-emetics





Intractable nausea and vomiting- due to medication and infection. Cont IV fluids

, antiemetics. 





#Dehydration


-continue intravenous saline


-antiemetics





Diet: Regular


Code: Full


VTE ppx: Low risk


Dispo: patient not able to take anything orally. Will make inpatient for 

intractable nausea and vomiting. 








Subjective: having voluminous watery diarrhea every 20 minutes. also with 

continued nausea and vomiting.


Objective: 


 Vital Signs











Temp Pulse Resp BP Pulse Ox


 


 36.9 C   54 L  16   106/61   97 


 


 04/17/19 12:01  04/17/19 12:01  04/17/19 12:01  04/17/19 12:01  04/17/19 12:01








 Laboratory Results





 04/17/19 04:48 





 04/17/19 04:48 





 











 04/16/19 04/17/19 04/18/19





 05:59 05:59 05:59


 


Intake Total  700 868


 


Output Total  1700 


 


Balance  -1000 868














- Physical Exam


Constitutional: no apparent distress, appears nourished, not in pain


Eyes: PERRL, anicteric sclera, EOMI


Ears, Nose, Mouth, Throat: moist mucous membranes, hearing normal, ears appear 

normal, no oral mucosal ulcers


Cardiovascular: regular rate and rhythym, no murmur, rub, or gallop


Respiratory: no respiratory distress, no rales or rhonchi, clear to auscultation


Gastrointestinal: normoactive bowel sounds, soft, non-tender abdomen, no 

palpable masses


Genitourinary: no bladder fullness, no bladder tenderness, no renal bruits


Skin: no rashes or abrasions, no fluctuance, no induration


Musculoskeletal: full muscle strength, no muscle tenderness, normal joint ROM


Neurologic: AAOx3, sensation intact bilaterally


Psychiatric: interacting appropriately, not anxious, not encephalopathic, 

thought process linear


Lymph, Heme, Immunologic: no cervical LAD, no supraclavicular LAD





ICD10 Worksheet


Patient Problems: 


 Problems











Problem Status Onset


 


Abdominal pain Acute  


 


Diarrhea due to cryptosporidium Acute

## 2019-04-18 VITALS — SYSTOLIC BLOOD PRESSURE: 114 MMHG | DIASTOLIC BLOOD PRESSURE: 68 MMHG

## 2019-04-18 RX ADMIN — DICYCLOMINE HYDROCHLORIDE SCH MG: 20 TABLET ORAL at 15:42

## 2019-04-18 RX ADMIN — DICYCLOMINE HYDROCHLORIDE SCH MG: 20 TABLET ORAL at 06:09

## 2019-04-18 RX ADMIN — ONDANSETRON PRN MG: 4 TABLET, ORALLY DISINTEGRATING ORAL at 14:29

## 2019-04-18 RX ADMIN — DICYCLOMINE HYDROCHLORIDE SCH MG: 20 TABLET ORAL at 12:18

## 2019-04-18 RX ADMIN — NITAZOXANIDE SCH MG: 500 TABLET ORAL at 10:07

## 2019-04-18 NOTE — PDDCSUM
Discharge Summary


Discharge Summary: 


Discharge diagnosis


Cryptosporidium parvum


Diarrhea


Intractable nausea and vomiting


Weakness








Patient is a 23-year-old female with no past medical history who presented to 

the emergency room with intractable nausea vomiting and diarrhea.  She had been 

to the clinic at the [facility] and diagnosed with Cryptosporidium 

and started on Alinia for this.  She continued to have such terrible diarrhea 

along with nausea and vomiting that she ended up coming to the emergency room.  

Her linea was continued she was started on IV fluids and antiemetics.  She 

continued to have voluminous diarrhea along with nausea and vomiting.  A GI 

pathogen panel was obtained but returned negative for any pathogens.  This was 

thought to be due to her being started on antibiotics already.  By day 2 her 

nausea vomiting and diarrhea had resolved and she had completed her course of 

antibiotics.  She felt better and wished to be discharged home, so she was 

discharged home to follow up with primary care physician for further evaluation 

if needed.





Disposition


Home independent

## 2019-04-22 ENCOUNTER — HOSPITAL ENCOUNTER (EMERGENCY)
Dept: HOSPITAL 80 - FED | Age: 24
Discharge: HOME | End: 2019-04-22
Payer: COMMERCIAL

## 2019-04-22 VITALS — SYSTOLIC BLOOD PRESSURE: 105 MMHG | DIASTOLIC BLOOD PRESSURE: 75 MMHG

## 2019-04-22 DIAGNOSIS — I82.622: Primary | ICD-10-CM

## 2020-03-06 NOTE — EDPHY
[address] [facility] Family [facility] Office Note  Chief Complaint:   Patient presents with:  Cough: f/u  Hip Pain: left side on and off for months       HPI:   This is a 80year old female coming in for  HPI  Follow up - INFLUENZA  Cough has improved   No H & P


Stated Complaint: DVT to LUE


Time Seen by Provider: 04/22/19 17:29


HPI/ROS: 





CHIEF COMPLAINT:  Left upper extremity swelling





HISTORY OF PRESENT ILLNESS:  The patient presents to the ED with complaints of 

left upper extremity swelling.  The patient was recently hospitalized and did 

have an IV in the extremity.  She was treated with IV antibiotics for 

Cryptosporidium.  The patient has no prior history of PE or DVT.  The patient 

does not smoke.  She does use a Nuva ring.  The patient denies any pleuritic 

chest pain.  She denies dyspnea.  She denies additional acute complaints.





REVIEW OF SYSTEMS:


A comprehensive 10 point review of systems is otherwise negative aside from 

elements mentioned in the history of present illness.


Source: Patient


Exam Limitations: No limitations





- Personal History


LMP (Females 10-55): 22-28 Days Ago


Current Tetanus/Diphtheria Vaccine: Yes


Current Tetanus Diphtheria and Acellular Pertussis (TDAP): Yes





- Medical/Surgical History


Hx Asthma: Yes


Hx Chronic Respiratory Disease: No


Hx Diabetes: No


Hx Cardiac Disease: No


Hx Renal Disease: No


Hx Cirrhosis: No


Hx Alcoholism: No


Hx HIV/AIDS: No


Hx Splenectomy or Spleen Trauma: No


Other PMH: asthma, anxiety, R shoulder surg, cryptosporidium, ovarian cyst





- Social History


Smoking Status: Never smoked





- Physical Exam


Exam: 





General Appearance:  Alert, no distress


Eyes:  Pupils equal and round no pallor or injection


ENT, Mouth:  Mucous membranes moist


Respiratory:  There are no retractions, lungs are clear to auscultation


Cardiovascular:  Regular rate and rhythm


Gastrointestinal:  Abdomen is soft and nontender, no masses, bowel sounds normal


Neurological:  5/5 strength noted all 4 extremities


Skin:  Warm and dry, no rashes


Musculoskeletal:  Neck is supple nontender


Extremities:  Mild swelling noted in the left upper extremity, normal arterial 

pulses, no evidence of compartment syndrome


Psychiatric:  Patient is oriented X 3, there is no agitation


Constitutional: 





 Initial Vital Signs











Temperature (C)  37.3 C   04/22/19 17:21


 


Heart Rate  62   04/22/19 17:21


 


Respiratory Rate  16   04/22/19 17:21


 


Blood Pressure  116/71   04/22/19 17:21


 


O2 Sat (%)  98   04/22/19 17:21








 











O2 Delivery Mode               Room Air














Allergies/Adverse Reactions: 


 





fluconazole [From Diflucan] Allergy (Unknown, Verified 04/16/19 17:11)


 


ciprofloxacin [From Cipro] Allergy (Verified 04/16/19 17:11)


 


metronidazole [From Flagyl] Allergy (Verified 04/16/19 17:11)


 


pineapple Allergy (Verified 04/16/19 17:11)


 








Home Medications: 














 Medication  Instructions  Recorded


 


Escitalopram Oxalate [Lexapro] 30 mg PO HS 12/01/16


 


Cyanocobalamin [Vitamin B12 1,000 mcg IM Q28D 04/16/19





1000MCG/ML (*)]  


 


Etonogestrel/Ethinyl Estradiol 1 each VG Q21D 04/16/19





[Nuvaring Vaginal Ring]  


 


Nitazoxanide [Alinia] 500 mg PO Q12 04/16/19


 


Ondansetron HCl 4 mg PO Q6HRS PRN 04/16/19


 


traZODone [traZODONE 50MG (*)] 50 mg PO HS 04/16/19


 


Apixaban [Eliquis 30-day Starter 1 kit PO AD #1 kit 04/22/19





Pack (PE/DVT treatment)]  














Medical Decision Making





- Diagnostics


Imaging Results: 





 Imaging Impressions





Extremity Venous Study  04/22/19 15:05


Impression:


1. Deep venous thrombus involving the mid to distal basilic vein in the left 

upper arm extending into the antecubital fossa to involve the medial 

antecubital vein.


2. Nonocclusive thrombus along the wall of the subclavian vein. 


 


Findings discussed with Lilli Cullen NP at  16:32 hour, 4/22/2019.











ED Course/Re-evaluation: 





I reviewed the results of the patient's ultrasound which demonstrates a DVT in 

the upper extremity.  The patient has no symptoms of PE or DVT.  I reviewed her 

recent laboratory studies from her last hospitalization which demonstrated a 

negative pregnancy test, no evidence of anemia and a normal creatinine.





The patient will be started on Eliquis in the emergency department.  She is 

advised to take 10 mg twice daily for the next week.  She will then transition 

to 5 mg twice daily.





Patient will be referred to Hematology for further evaluation of her DVT and 

consideration of a hypercoagulable workup.








Differential Diagnosis: 





Differential diagnosis considered includes DVT, cellulitis, abscess





Departure





- Departure


Disposition: Home, Routine, Self-Care


Clinical Impression: 


DVT of upper extremity (deep vein thrombosis)


Qualifiers:


 Chronicity: acute Laterality: left 





Condition: Good


Instructions:  Deep Vein Thrombosis (ED)


Additional Instructions: 


1. Please schedule a follow-up appointment with your primary care provider for 

a recheck within the week.  Please discuss with them a possible referral to 

Hematology for further evaluation of your blood clot.  You have been given 

contact information of our on-call hematologist.


2. Please start taking Eliquis as prescribed.  Take  10 mg twice a day for the 

next 7 days and then begin taking 5 mg twice daily thereafter.


3. Return to the ED for severe chest pain, difficulty breathing or other 

concerns.





Referrals: 


LILLI CULLEN MS FNP-C [Other] - As per Instructions


Simón Otero MD [Medical Doctor] - As per Instructions Activated Inhale 1 puff into the lungs daily. 1 samples  Lot: [id number]  Exp: 2/2021 1 each 0   • albuterol sulfate (2.5 MG/3ML) 0.083% Inhalation Nebu Soln Take 3 mL (2.5 mg total) by nebulization every 4 (four) hours while awake.  2 Box 0   • Warfarin Sodium 4 myalgias. Skin: Negative for pallor and rash. Neurological: Negative for dizziness and headaches.         EXAM:   /60   Pulse 70   Temp 97.9 °F (36.6 °C) (Oral)   Resp 16   Ht 59\"   Wt 190 lb (86.2 kg)   SpO2 96%   BMI 38.38 kg/m²  Estimated body Maintenance:  Pneumococcal PPSV23/PCV13 65+ Years / Low and Medium Risk(1 of 2 - PCV13) due on 01/30/2004  Annual Physical due on 03/12/2020  Annual Depression Screen due on 07/29/2020  Fall Risk Screening due on 02/24/2021  Influenza Vaccine Completed  DE